# Patient Record
Sex: MALE | Race: WHITE | HISPANIC OR LATINO
[De-identification: names, ages, dates, MRNs, and addresses within clinical notes are randomized per-mention and may not be internally consistent; named-entity substitution may affect disease eponyms.]

---

## 2017-03-24 ENCOUNTER — APPOINTMENT (OUTPATIENT)
Dept: PULMONOLOGY | Facility: CLINIC | Age: 67
End: 2017-03-24

## 2017-03-24 VITALS
SYSTOLIC BLOOD PRESSURE: 122 MMHG | BODY MASS INDEX: 25.11 KG/M2 | RESPIRATION RATE: 17 BRPM | HEART RATE: 74 BPM | OXYGEN SATURATION: 97 % | DIASTOLIC BLOOD PRESSURE: 80 MMHG | WEIGHT: 160 LBS | HEIGHT: 67 IN

## 2017-03-24 DIAGNOSIS — R79.89 OTHER SPECIFIED ABNORMAL FINDINGS OF BLOOD CHEMISTRY: ICD-10-CM

## 2017-06-01 ENCOUNTER — LABORATORY RESULT (OUTPATIENT)
Age: 67
End: 2017-06-01

## 2017-06-13 ENCOUNTER — APPOINTMENT (OUTPATIENT)
Age: 67
End: 2017-06-13

## 2017-07-20 ENCOUNTER — APPOINTMENT (OUTPATIENT)
Age: 67
End: 2017-07-20

## 2017-07-20 VITALS
BODY MASS INDEX: 25.11 KG/M2 | DIASTOLIC BLOOD PRESSURE: 85 MMHG | WEIGHT: 160 LBS | HEIGHT: 67 IN | RESPIRATION RATE: 17 BRPM | SYSTOLIC BLOOD PRESSURE: 128 MMHG | HEART RATE: 73 BPM | TEMPERATURE: 98.5 F

## 2017-07-20 RX ORDER — MOMETASONE FUROATE 1 MG/G
0.1 CREAM TOPICAL TWICE DAILY
Qty: 15 | Refills: 3 | Status: DISCONTINUED | COMMUNITY
Start: 2017-03-24 | End: 2017-07-20

## 2017-08-29 ENCOUNTER — OUTPATIENT (OUTPATIENT)
Dept: OUTPATIENT SERVICES | Facility: HOSPITAL | Age: 67
LOS: 1 days | End: 2017-08-29
Payer: MEDICARE

## 2017-08-29 ENCOUNTER — APPOINTMENT (OUTPATIENT)
Age: 67
End: 2017-08-29

## 2017-08-29 DIAGNOSIS — B18.2 CHRONIC VIRAL HEPATITIS C: ICD-10-CM

## 2017-08-29 PROCEDURE — 45378 DIAGNOSTIC COLONOSCOPY: CPT | Mod: GC

## 2017-08-29 PROCEDURE — G0121: CPT

## 2017-10-06 ENCOUNTER — APPOINTMENT (OUTPATIENT)
Dept: PULMONOLOGY | Facility: CLINIC | Age: 67
End: 2017-10-06

## 2018-06-29 ENCOUNTER — LABORATORY RESULT (OUTPATIENT)
Age: 68
End: 2018-06-29

## 2018-07-20 ENCOUNTER — APPOINTMENT (OUTPATIENT)
Dept: HEPATOLOGY | Facility: CLINIC | Age: 68
End: 2018-07-20
Payer: MEDICARE

## 2018-07-20 VITALS
WEIGHT: 159 LBS | HEIGHT: 67 IN | DIASTOLIC BLOOD PRESSURE: 70 MMHG | HEART RATE: 63 BPM | OXYGEN SATURATION: 96 % | BODY MASS INDEX: 24.96 KG/M2 | TEMPERATURE: 97.8 F | SYSTOLIC BLOOD PRESSURE: 130 MMHG

## 2018-07-20 PROCEDURE — 99213 OFFICE O/P EST LOW 20 MIN: CPT

## 2018-07-20 RX ORDER — SODIUM SULFATE, POTASSIUM SULFATE, MAGNESIUM SULFATE 17.5; 3.13; 1.6 G/ML; G/ML; G/ML
17.5-3.13-1.6 SOLUTION, CONCENTRATE ORAL
Qty: 1 | Refills: 0 | Status: DISCONTINUED | COMMUNITY
Start: 2017-07-20 | End: 2018-07-20

## 2018-07-27 PROBLEM — R79.89 LOW TESTOSTERONE: Status: ACTIVE | Noted: 2017-03-24

## 2018-09-24 ENCOUNTER — APPOINTMENT (OUTPATIENT)
Dept: OPHTHALMOLOGY | Facility: CLINIC | Age: 68
End: 2018-09-24
Payer: MEDICARE

## 2018-09-24 DIAGNOSIS — H49.12 FOURTH [TROCHLEAR] NERVE PALSY, LEFT EYE: ICD-10-CM

## 2018-09-24 DIAGNOSIS — H53.2 DIPLOPIA: ICD-10-CM

## 2018-09-24 DIAGNOSIS — Z09 ENCOUNTER FOR FOLLOW-UP EXAMINATION AFTER COMPLETED TREATMENT FOR CONDITIONS OTHER THAN MALIGNANT NEOPLASM: ICD-10-CM

## 2018-09-24 PROCEDURE — 92060 SENSORIMOTOR EXAMINATION: CPT

## 2018-09-24 PROCEDURE — 99204 OFFICE O/P NEW MOD 45 MIN: CPT

## 2019-07-16 LAB
ALBUMIN SERPL ELPH-MCNC: 4.6 G/DL
ALP BLD-CCNC: 81 U/L
ALT SERPL-CCNC: 16 U/L
AST SERPL-CCNC: 18 U/L
BASOPHILS # BLD AUTO: 0.07 K/UL
BASOPHILS NFR BLD AUTO: 0.7 %
BILIRUB DIRECT SERPL-MCNC: 0.1 MG/DL
BILIRUB INDIRECT SERPL-MCNC: 0.2 MG/DL
BILIRUB SERPL-MCNC: 0.3 MG/DL
EOSINOPHIL # BLD AUTO: 0.24 K/UL
EOSINOPHIL NFR BLD AUTO: 2.4 %
HCT VFR BLD CALC: 45.3 %
HGB BLD-MCNC: 14.8 G/DL
IMM GRANULOCYTES NFR BLD AUTO: 0.5 %
LYMPHOCYTES # BLD AUTO: 2.11 K/UL
LYMPHOCYTES NFR BLD AUTO: 20.8 %
MAN DIFF?: NORMAL
MCHC RBC-ENTMCNC: 32 PG
MCHC RBC-ENTMCNC: 32.7 GM/DL
MCV RBC AUTO: 98.1 FL
MONOCYTES # BLD AUTO: 0.62 K/UL
MONOCYTES NFR BLD AUTO: 6.1 %
NEUTROPHILS # BLD AUTO: 7.04 K/UL
NEUTROPHILS NFR BLD AUTO: 69.5 %
PLATELET # BLD AUTO: 226 K/UL
PROT SERPL-MCNC: 7.3 G/DL
RBC # BLD: 4.62 M/UL
RBC # FLD: 11.9 %
WBC # FLD AUTO: 10.13 K/UL

## 2019-07-17 LAB
HCV RNA SERPL NAA DL=5-ACNC: NOT DETECTED IU/ML
HCV RNA SERPL NAA+PROBE-LOG IU: NOT DETECTED LOGIU/ML

## 2019-07-19 ENCOUNTER — APPOINTMENT (OUTPATIENT)
Dept: HEPATOLOGY | Facility: CLINIC | Age: 69
End: 2019-07-19
Payer: MEDICARE

## 2019-07-19 VITALS
SYSTOLIC BLOOD PRESSURE: 143 MMHG | HEIGHT: 67 IN | BODY MASS INDEX: 25.27 KG/M2 | DIASTOLIC BLOOD PRESSURE: 82 MMHG | WEIGHT: 161 LBS | HEART RATE: 65 BPM | TEMPERATURE: 98.2 F | RESPIRATION RATE: 17 BRPM

## 2019-07-19 PROCEDURE — ZZZZZ: CPT

## 2019-07-19 PROCEDURE — 91200 LIVER ELASTOGRAPHY: CPT

## 2019-07-19 PROCEDURE — 99214 OFFICE O/P EST MOD 30 MIN: CPT | Mod: 25

## 2019-07-19 NOTE — PHYSICAL EXAM
[General Appearance - Alert] : alert [General Appearance - In No Acute Distress] : in no acute distress [Sclera] : the sclera and conjunctiva were normal [Neck Appearance] : the appearance of the neck was normal [Neck Cervical Mass (___cm)] : no neck mass was observed [Jugular Venous Distention Increased] : there was no jugular-venous distention [Thyroid Diffuse Enlargement] : the thyroid was not enlarged [Thyroid Nodule] : there were no palpable thyroid nodules [Auscultation Breath Sounds / Voice Sounds] : lungs were clear to auscultation bilaterally [Heart Rate And Rhythm] : heart rate was normal and rhythm regular [Heart Sounds] : normal S1 and S2 [Full Pulse] : the pedal pulses are present [Edema] : there was no peripheral edema [Bowel Sounds] : normal bowel sounds [Abdomen Soft] : soft [Abdomen Tenderness] : non-tender [] : no hepato-splenomegaly [Abdomen Mass (___ Cm)] : no abdominal mass palpated [Oriented To Time, Place, And Person] : oriented to person, place, and time [Scleral Icterus] : No Scleral Icterus [Hepatojugular Reflux] : patient did not have a sustained hepatojugular reflux [Spider Angioma] : No spider angioma(s) were observed [Abdominal Bruit] : no abdominal bruit [Abdominal  Ascites] : no ascites [Splenomegaly] : no splenomegaly [Asterixis] : no asterixis observed [Jaundice] : No jaundice [Palmar Erythema] : no Palmar Erythema

## 2019-07-19 NOTE — CONSULT LETTER
[Dear  ___] : Dear  [unfilled], [Courtesy Letter:] : I had the pleasure of seeing your patient, [unfilled], in my office today. [Please see my note below.] : Please see my note below. [Consult Closing:] : Thank you very much for allowing me to participate in the care of this patient.  If you have any questions, please do not hesitate to contact me. [Sincerely,] : Sincerely, [Marcella Schroeder, N.P] : Marcella Schroeder, N.P [FreeTextEntry2] : Dr. Tj Augustine

## 2019-07-19 NOTE — REVIEW OF SYSTEMS
[Negative] : Psychiatric [Recent Weight Gain (___ Lbs)] : no recent weight gain [Recent Weight Loss (___ Lbs)] : no recent weight loss

## 2019-07-19 NOTE — ASSESSMENT
[FreeTextEntry1] : 69-year-old male with history of hepatitis C with F0-F1 fibrosis on fibroscan tests S/P treatment since 2012 who is a sustained virologic responder.  He is stable and doing well. \par \par I have discussed with patient the finding of steatosis on Fibroscan. His liver enzymes are normal. I spoke with the patient at length regarding fatty liver disease. I have reviewed the spectrum of disease as well as the risk of disease progression. I have explained that patient with fatty liver disease may progress to the development of cirrhosis and its complications. I have explained that fatty liver disease is commonly seen in patients with diabetes or those who are overweight. I have explained that fatty liver disease may also be a precursor to the development of diabetes as it is part of the metabolic syndrome. I have reviewed the treatment of fatty liver disease with the patient. I have explained that the best therapy is diet and exercise. I have recommended the avoidance of fatty foods and to follow a good healthy heart diet.  \par \par He will need to repeat colonoscopy in 2022.\par \par  I will see him back in the office in one year with repeat labs and imaging.  I have asked the patient to call me if he has any questions or concerns in the interim.\par \par

## 2019-07-19 NOTE — HISTORY OF PRESENT ILLNESS
[Ordering Test(s) ___] : ordering [unfilled] [None] : ~he/she~ had no significant interval events [Fatigue] : denies fatigue [Malaise] : denies malaise [Fever] : denies fever [Diffuse Joint Pain (Arthralgias)] : denies arthralgias [Muscle Aches, Generalized (Myalgias)] : denies myalgias [Yellow Skin Or Eyes (Jaundice)] : denies jaundice [Abdominal Pain] : denies abdominal pain [Urine Tests Nonspecific Abnormal Findings Biliuria] : denies dark urine [Light Colored Bowel Movement (Acholic Stools)] : denies pale stools [Itching (Pruritus)] : denies pruritus [___] : Performed [unfilled] [de-identified] : Darren comes in today for followup visit. Patient has a history of hepatitis C who completed 28 weeks of therapy for hepatitis C with Pegasys, ribavirin and Victrelis on March 5, 2012 and is a sustained virologic responder.  He is feeling well without any other complaints. He denies abdominal pain, nausea, vomiting, jaundice or pruritus. He has no changes in bowel habits. He does not do routine exercise. \par \par  Fibroscan test from 5/8/15 showed F0-F1. \par Fibroscan test performed  7/20/17 showed F0-F1, steatosis S1. \par Fibroscan test from 7/19/19 showed F0-F1, S1\par \par Last colonoscopy was done in May 2010 revealing normal colon. His mother has colon cancer in Dec 2016. Colonoscopy from 8/29/17 was normal. \par \par Blood tests from June 1, 2017 ALT 23, AST 22, total bilirubin 0.3, HCV RNA not detected.\par Blood test from March 12, 2015 ALT 14, AST 15, total bilirubin 0.3, HCV RNA not detected.\par \par Reviewed of laboratory data from 2/20/14 ALT 19, AST 20, total bilirubin 0.2, albumin 4.1, normal CBC with platelets, HCV RNA not detected.\par \par

## 2019-09-02 PROBLEM — Z09 FOLLOW UP: Status: ACTIVE | Noted: 2018-07-20

## 2020-03-02 ENCOUNTER — NON-APPOINTMENT (OUTPATIENT)
Age: 70
End: 2020-03-02

## 2020-03-02 ENCOUNTER — APPOINTMENT (OUTPATIENT)
Dept: PULMONOLOGY | Facility: CLINIC | Age: 70
End: 2020-03-02
Payer: MEDICARE

## 2020-03-02 VITALS
OXYGEN SATURATION: 93 % | RESPIRATION RATE: 17 BRPM | HEIGHT: 66 IN | BODY MASS INDEX: 25.23 KG/M2 | WEIGHT: 157 LBS | DIASTOLIC BLOOD PRESSURE: 64 MMHG | SYSTOLIC BLOOD PRESSURE: 120 MMHG | HEART RATE: 86 BPM

## 2020-03-02 DIAGNOSIS — J45.909 ACUTE BRONCHITIS, UNSPECIFIED: ICD-10-CM

## 2020-03-02 DIAGNOSIS — R93.89 ABNORMAL FINDINGS ON DIAGNOSTIC IMAGING OF OTHER SPECIFIED BODY STRUCTURES: ICD-10-CM

## 2020-03-02 DIAGNOSIS — J45.909 UNSPECIFIED ASTHMA, UNCOMPLICATED: ICD-10-CM

## 2020-03-02 DIAGNOSIS — J20.9 ACUTE BRONCHITIS, UNSPECIFIED: ICD-10-CM

## 2020-03-02 DIAGNOSIS — R04.2 HEMOPTYSIS: ICD-10-CM

## 2020-03-02 DIAGNOSIS — R09.82 POSTNASAL DRIP: ICD-10-CM

## 2020-03-02 DIAGNOSIS — J30.9 ALLERGIC RHINITIS, UNSPECIFIED: ICD-10-CM

## 2020-03-02 DIAGNOSIS — R94.2 ABNORMAL RESULTS OF PULMONARY FUNCTION STUDIES: ICD-10-CM

## 2020-03-02 PROCEDURE — 71046 X-RAY EXAM CHEST 2 VIEWS: CPT

## 2020-03-02 PROCEDURE — 94010 BREATHING CAPACITY TEST: CPT

## 2020-03-02 PROCEDURE — 99214 OFFICE O/P EST MOD 30 MIN: CPT | Mod: 25

## 2020-03-02 NOTE — PHYSICAL EXAM
[General Appearance - Well Developed] : well developed [Well Groomed] : well groomed [Normal Appearance] : normal appearance [General Appearance - Well Nourished] : well nourished [No Deformities] : no deformities [General Appearance - In No Acute Distress] : no acute distress [Eyelids - No Xanthelasma] : the eyelids demonstrated no xanthelasmas [Normal Conjunctiva] : the conjunctiva exhibited no abnormalities [Normal Oropharynx] : normal oropharynx [II] : II [Neck Appearance] : the appearance of the neck was normal [Neck Cervical Mass (___cm)] : no neck mass was observed [Thyroid Diffuse Enlargement] : the thyroid was not enlarged [Jugular Venous Distention Increased] : there was no jugular-venous distention [Heart Rate And Rhythm] : heart rate and rhythm were normal [Thyroid Nodule] : there were no palpable thyroid nodules [Heart Sounds] : normal S1 and S2 [Murmurs] : no murmurs present [Respiration, Rhythm And Depth] : normal respiratory rhythm and effort [Exaggerated Use Of Accessory Muscles For Inspiration] : no accessory muscle use [Abdomen Soft] : soft [Abdomen Tenderness] : non-tender [Abnormal Walk] : normal gait [Abdomen Mass (___ Cm)] : no abdominal mass palpated [Gait - Sufficient For Exercise Testing] : the gait was sufficient for exercise testing [Nail Clubbing] : no clubbing of the fingernails [Cyanosis, Localized] : no localized cyanosis [Petechial Hemorrhages (___cm)] : no petechial hemorrhages [Skin Color & Pigmentation] : normal skin color and pigmentation [No Venous Stasis] : no venous stasis [] : no rash [No Skin Ulcers] : no skin ulcer [Skin Lesions] : no skin lesions [No Xanthoma] : no  xanthoma was observed [Deep Tendon Reflexes (DTR)] : deep tendon reflexes were 2+ and symmetric [Sensation] : the sensory exam was normal to light touch and pinprick [No Focal Deficits] : no focal deficits [Oriented To Time, Place, And Person] : oriented to person, place, and time [Affect] : the affect was normal [Impaired Insight] : insight and judgment were intact [FreeTextEntry1] : I:E ratio 1:3; mild wheeze and rhonchi

## 2020-03-02 NOTE — HISTORY OF PRESENT ILLNESS
[FreeTextEntry1] : Mr. Swann is a 69 year old male presenting to the office today for a follow up visit for abnormal CT scan, abnormal PFT, allergies, asthma, and post nasal drip syndrome. His chief complaint is bronchitis\par -his wife reports he has been bringing up sputum. He has a post nasal drip and usually brings up the sputum. His sputum has now become darker in color / bloody about 2 weeks ago, and then saw Dr. Augustine, who gave him ABx (Zithromax) therapy with 1 day left\par -he reports he has a dry mouth occasionally\par -He notes His bowels are regular \par -his wife states he had a UTI recently, and has not been sick otherwise\par -he has had an ablation with [facility] since his last visit\par -he is sleeping well, and denies snoring\par -he reports he is not using any therapy for his post nasal drip\par -he denies any SOB, orthopnea, fever, chills, sweats, chest pain, chest pressure, diarrhea, constipation, dysphagia, dizziness, sour taste in the mouth,heartburn, reflux, myalgias or arthralgias.

## 2020-03-02 NOTE — ADDENDUM
[FreeTextEntry1] : Documented by Jhony Li acting as a scribe for Dr. Oni Jorgensen on 03/02/2020.\par \par All medical record entries made by the Scribe were at my, Dr. Oni Jorgensen's, direction and personally dictated by me on 03/02/2020. I have reviewed the chart and agree that the record accurately reflects my personal performance of the history, physical exam, assessment and plan. I have also personally directed, reviewed, and agree with the discharge instructions.

## 2020-03-02 NOTE — ASSESSMENT
[FreeTextEntry1] : Mr. Swann has a history of DM, Afib s/p ablation, 4th nerve palsy, HTN, mild intermittent asthma and allergies, presenting to the office in the midst of acute bronchitis / asthmatic bronchitis\par \par Problem 1A: Acute bronchitis\par -continue Zithromax therapy\par \par You have a clinical scenario most c/q acute bronchitis the etiology of which is unknown but empiric antibiotics are indicated. Hydration, mucolytics including mucinex, robitussin and the like are indicated. Cough controlling agents will be needed. \par \par problem 1: mild intermittent asthma\par -Add generic Advair (250) 1 inhalation BID \par \par -Asthma is  believed to be caused by inherited (genetic) and environmental factor, but its exact cause is unknown. Asthma may be triggered by allergens, lung infections, or irritants in the air. Asthma triggers are different for each person\par \par problem 2: allergies and sinuses \par -Add Olopatadine 0.6% at 1 sniff/nostril BID \par -recommended to use baby shampoo to clean his eye lashes\par -can add OTC antihistamines as needed\par -continue to use nasal saline \par \par -Environmental measures for allergies were encouraged including mattress and pillow cover, air purifier, and environmental controls. \par \par problem 3: history of an abnormal chest CT / Hemoptysis\par -showed a 5 mm nodule which has been stable\par -he should have a follow up chest CT at September 2017, due now \par \par problem 4: itchy ears\par -he is being added mometasone cream \par \par problem 5: concerns for memory \par -he is being recommended to use OTC resveratrol \par \par problem 6: use of hyperlipidemia medicine\par -he is being added vitamin D at 50,000 units once a month \par \par F/U in 4-6 months\par He is encouraged to call with any changes, concerns, or questions.

## 2020-03-02 NOTE — REVIEW OF SYSTEMS
[Postnasal Drip] : postnasal drip [Hemoptysis] : hemoptysis [Sputum] : sputum [Negative] : Endocrine [Dyspnea] : no dyspnea [Chest Discomfort] : no chest discomfort

## 2020-03-02 NOTE — PROCEDURE
[FreeTextEntry1] : CXR reveals a normal sized heart; no evidence of infiltrate or effusion--a normal appearing chest radiograph \par \par PFT revealed normal flows, with a FEV1 of 2.50L, which is 90% of predicted, with a normal flow volume loop

## 2020-07-06 ENCOUNTER — TRANSCRIPTION ENCOUNTER (OUTPATIENT)
Age: 70
End: 2020-07-06

## 2020-07-10 ENCOUNTER — APPOINTMENT (OUTPATIENT)
Dept: HEPATOLOGY | Facility: CLINIC | Age: 70
End: 2020-07-10

## 2020-07-24 ENCOUNTER — APPOINTMENT (OUTPATIENT)
Dept: HEPATOLOGY | Facility: CLINIC | Age: 70
End: 2020-07-24

## 2020-07-24 LAB
ALBUMIN SERPL ELPH-MCNC: 4.9 G/DL
ALP BLD-CCNC: 82 U/L
ALT SERPL-CCNC: 13 U/L
AST SERPL-CCNC: 16 U/L
BILIRUB DIRECT SERPL-MCNC: 0.1 MG/DL
BILIRUB INDIRECT SERPL-MCNC: 0.2 MG/DL
BILIRUB SERPL-MCNC: 0.3 MG/DL
PROT SERPL-MCNC: 7.6 G/DL

## 2020-07-27 LAB
HCV RNA SERPL NAA DL=5-ACNC: NOT DETECTED IU/ML
HCV RNA SERPL NAA+PROBE-LOG IU: NOT DETECTED LOG10IU/ML

## 2020-07-29 ENCOUNTER — APPOINTMENT (OUTPATIENT)
Dept: HEPATOLOGY | Facility: CLINIC | Age: 70
End: 2020-07-29
Payer: MEDICARE

## 2020-07-29 VITALS
SYSTOLIC BLOOD PRESSURE: 139 MMHG | BODY MASS INDEX: 24.27 KG/M2 | WEIGHT: 151 LBS | HEIGHT: 66 IN | TEMPERATURE: 97.9 F | HEART RATE: 85 BPM | DIASTOLIC BLOOD PRESSURE: 81 MMHG | RESPIRATION RATE: 17 BRPM

## 2020-07-29 PROCEDURE — 99213 OFFICE O/P EST LOW 20 MIN: CPT

## 2020-07-29 RX ORDER — OLOPATADINE HYDROCHLORIDE 665 UG/1
0.6 SPRAY, METERED NASAL
Qty: 3 | Refills: 1 | Status: DISCONTINUED | COMMUNITY
Start: 2020-03-02 | End: 2020-07-29

## 2020-07-29 NOTE — ASSESSMENT
[FreeTextEntry1] : Mr. 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  He is stable and doing well. \par \par PLAN To continue the current treatment for maintenance.\par F/U in one year with repeat labs and imaging.  I have asked the patient to call me if he has any questions or concerns in the interim.\par Colonoscopy- He will need to repeat colonoscopy in 2022.\par \par I have discussed with patient the finding of steatosis on Fibroscan. His liver enzymes are normal. I spoke with the patient at length regarding fatty liver disease. I have reviewed the spectrum of disease as well as the risk of disease progression. I have explained that patient with fatty liver disease may progress to the development of cirrhosis and its complications. I have explained that fatty liver disease is commonly seen in patients with diabetes or those who are overweight. I have explained that fatty liver disease may also be a precursor to the development of diabetes as it is part of the metabolic syndrome. I have reviewed the treatment of fatty liver disease with the patient. I have explained that the best therapy is diet and exercise. I have recommended the avoidance of fatty foods and to follow a good healthy heart diet.  \par \par Encouraged to call back in the interim with any issues or concerns so that we can address and assist as required.\par

## 2020-07-29 NOTE — HISTORY OF PRESENT ILLNESS
[Ordering Test(s) ___] : ordering [unfilled] [None] : ~he/she~ had no significant interval events [Fatigue] : denies fatigue [Malaise] : denies malaise [Diffuse Joint Pain (Arthralgias)] : denies arthralgias [Fever] : denies fever [Yellow Skin Or Eyes (Jaundice)] : denies jaundice [Muscle Aches, Generalized (Myalgias)] : denies myalgias [Urine Tests Nonspecific Abnormal Findings Biliuria] : denies dark urine [Abdominal Pain] : denies abdominal pain [Light Colored Bowel Movement (Acholic Stools)] : denies pale stools [Itching (Pruritus)] : denies pruritus [___] : Performed [unfilled] [de-identified] : Mr. AMRIK INMAN is 70 year old male who presents for the follow up appointment with H/O hepatitis C who completed 28 weeks of therapy for hepatitis C with Pegasys, ribavirin and Victrelis on March 5, 2012 and is a sustained virologic responder.  He is feeling well without any other complaints. He denies abdominal pain, nausea, vomiting, jaundice or pruritus. He has no changes in bowel habits. He does not do routine exercise. \par \par Reviewed Ultrasound Abdomen 07/27/20 Normal Abdominal Ultrasound results with No focal lesions.\par \par Fibroscan test from 7/19/19 showed F0-F1, S1\par Fibroscan test performed 7/20/17 showed F0-F1, steatosis S1. \par Fibroscan test from 5/8/15 showed F0-F1. \par \par Colonoscopy from 8/29/17 was normal.\par Colonoscopy was done in May 2010 revealing normal colon. His mother has colon cancer in Dec 2016. \par \par Blood tests from 07/23/20 HCV RNA not detected. TPr 7.6 , Alb 4.9 g/dL, TBi 0.3 mg/dL, AST 16 , ALT 13 , ALP 82 U/L \par June 1, 2017 ALT 23, AST 22, total bilirubin 0.3, HCV RNA not detected.\par Blood test from March 12, 2015 ALT 14, AST 15, total bilirubin 0.3, HCV RNA not detected.\par 2/20/14 ALT 19, AST 20, total bilirubin 0.2, albumin 4.1, normal CBC with platelets, HCV RNA not detected.\par

## 2020-07-29 NOTE — PHYSICAL EXAM
[General Appearance - Alert] : alert [General Appearance - In No Acute Distress] : in no acute distress [Sclera] : the sclera and conjunctiva were normal [Neck Cervical Mass (___cm)] : no neck mass was observed [Neck Appearance] : the appearance of the neck was normal [Jugular Venous Distention Increased] : there was no jugular-venous distention [Thyroid Diffuse Enlargement] : the thyroid was not enlarged [Thyroid Nodule] : there were no palpable thyroid nodules [Auscultation Breath Sounds / Voice Sounds] : lungs were clear to auscultation bilaterally [Heart Rate And Rhythm] : heart rate was normal and rhythm regular [Heart Sounds] : normal S1 and S2 [Full Pulse] : the pedal pulses are present [Edema] : there was no peripheral edema [Bowel Sounds] : normal bowel sounds [Abdomen Soft] : soft [Abdomen Tenderness] : non-tender [Abdomen Mass (___ Cm)] : no abdominal mass palpated [Oriented To Time, Place, And Person] : oriented to person, place, and time [Scleral Icterus] : No Scleral Icterus [Hepatojugular Reflux] : patient did not have a sustained hepatojugular reflux [Spider Angioma] : No spider angioma(s) were observed [Abdominal Bruit] : no abdominal bruit [Abdominal  Ascites] : no ascites [Splenomegaly] : no splenomegaly [Asterixis] : no asterixis observed [Jaundice] : No jaundice [Palmar Erythema] : no Palmar Erythema [Cervical Lymph Nodes Enlarged Posterior Bilaterally] : posterior cervical [Abnormal Walk] : normal gait [Supraclavicular Lymph Nodes Enlarged Bilaterally] : supraclavicular [Skin Color & Pigmentation] : normal skin color and pigmentation [] : no rash

## 2020-09-11 ENCOUNTER — APPOINTMENT (OUTPATIENT)
Dept: PULMONOLOGY | Facility: CLINIC | Age: 70
End: 2020-09-11

## 2021-06-09 ENCOUNTER — OUTPATIENT (OUTPATIENT)
Dept: OUTPATIENT SERVICES | Facility: HOSPITAL | Age: 71
LOS: 1 days | End: 2021-06-09
Payer: MEDICARE

## 2021-06-09 ENCOUNTER — APPOINTMENT (OUTPATIENT)
Dept: NUCLEAR MEDICINE | Facility: IMAGING CENTER | Age: 71
End: 2021-06-09
Payer: MEDICARE

## 2021-06-09 DIAGNOSIS — Z00.8 ENCOUNTER FOR OTHER GENERAL EXAMINATION: ICD-10-CM

## 2021-06-09 PROCEDURE — 78608 BRAIN IMAGING (PET): CPT | Mod: 26

## 2021-06-09 PROCEDURE — A9552: CPT

## 2021-06-09 PROCEDURE — 78608 BRAIN IMAGING (PET): CPT

## 2021-07-09 ENCOUNTER — APPOINTMENT (OUTPATIENT)
Dept: RADIOLOGY | Facility: HOSPITAL | Age: 71
End: 2021-07-09
Payer: MEDICARE

## 2021-07-09 ENCOUNTER — OUTPATIENT (OUTPATIENT)
Dept: OUTPATIENT SERVICES | Facility: HOSPITAL | Age: 71
LOS: 1 days | End: 2021-07-09
Payer: MEDICARE

## 2021-07-09 DIAGNOSIS — G91.2 (IDIOPATHIC) NORMAL PRESSURE HYDROCEPHALUS: ICD-10-CM

## 2021-07-09 LAB — GLUCOSE CSF-MCNC: <6 MG/DL — LOW (ref 40–70)

## 2021-07-09 PROCEDURE — 87205 SMEAR GRAM STAIN: CPT

## 2021-07-09 PROCEDURE — 82945 GLUCOSE OTHER FLUID: CPT

## 2021-07-09 PROCEDURE — 87070 CULTURE OTHR SPECIMN AEROBIC: CPT

## 2021-07-09 PROCEDURE — 62328 DX LMBR SPI PNXR W/FLUOR/CT: CPT

## 2021-07-09 PROCEDURE — 84157 ASSAY OF PROTEIN OTHER: CPT

## 2021-07-09 PROCEDURE — 89051 BODY FLUID CELL COUNT: CPT

## 2021-08-25 ENCOUNTER — OUTPATIENT (OUTPATIENT)
Dept: OUTPATIENT SERVICES | Facility: HOSPITAL | Age: 71
LOS: 1 days | End: 2021-08-25
Payer: MEDICARE

## 2021-08-25 ENCOUNTER — APPOINTMENT (OUTPATIENT)
Dept: ULTRASOUND IMAGING | Facility: IMAGING CENTER | Age: 71
End: 2021-08-25
Payer: MEDICARE

## 2021-08-25 DIAGNOSIS — B18.2 CHRONIC VIRAL HEPATITIS C: ICD-10-CM

## 2021-08-25 DIAGNOSIS — Z00.8 ENCOUNTER FOR OTHER GENERAL EXAMINATION: ICD-10-CM

## 2021-08-25 PROCEDURE — 76700 US EXAM ABDOM COMPLETE: CPT | Mod: 26

## 2021-08-25 PROCEDURE — 76700 US EXAM ABDOM COMPLETE: CPT

## 2021-08-26 LAB
ALBUMIN SERPL ELPH-MCNC: 4.4 G/DL
ALBUMIN SERPL ELPH-MCNC: 4.5 G/DL
ALP BLD-CCNC: 93 U/L
ALP BLD-CCNC: 94 U/L
ALT SERPL-CCNC: 12 U/L
ALT SERPL-CCNC: 15 U/L
ANION GAP SERPL CALC-SCNC: 15 MMOL/L
AST SERPL-CCNC: 13 U/L
AST SERPL-CCNC: 14 U/L
BASOPHILS # BLD AUTO: 0.03 K/UL
BASOPHILS NFR BLD AUTO: 0.3 %
BILIRUB DIRECT SERPL-MCNC: 0.1 MG/DL
BILIRUB INDIRECT SERPL-MCNC: 0.2 MG/DL
BILIRUB SERPL-MCNC: 0.3 MG/DL
BILIRUB SERPL-MCNC: 0.3 MG/DL
BUN SERPL-MCNC: 13 MG/DL
CALCIUM SERPL-MCNC: 10 MG/DL
CHLORIDE SERPL-SCNC: 102 MMOL/L
CO2 SERPL-SCNC: 24 MMOL/L
CREAT SERPL-MCNC: 0.75 MG/DL
EOSINOPHIL # BLD AUTO: 0.2 K/UL
EOSINOPHIL NFR BLD AUTO: 1.8 %
HCT VFR BLD CALC: 47.5 %
HCV RNA SERPL NAA DL=5-ACNC: NOT DETECTED
HCV RNA SERPL NAA+PROBE-LOG IU: NOT DETECTED LOG10IU/ML
HGB BLD-MCNC: 15.5 G/DL
IMM GRANULOCYTES NFR BLD AUTO: 1.1 %
LYMPHOCYTES # BLD AUTO: 1.52 K/UL
LYMPHOCYTES NFR BLD AUTO: 14 %
MAN DIFF?: NORMAL
MCHC RBC-ENTMCNC: 32.2 PG
MCHC RBC-ENTMCNC: 32.6 GM/DL
MCV RBC AUTO: 98.5 FL
MONOCYTES # BLD AUTO: 0.78 K/UL
MONOCYTES NFR BLD AUTO: 7.2 %
NEUTROPHILS # BLD AUTO: 8.24 K/UL
NEUTROPHILS NFR BLD AUTO: 75.6 %
PLATELET # BLD AUTO: 204 K/UL
POTASSIUM SERPL-SCNC: 4.4 MMOL/L
PROT SERPL-MCNC: 6.9 G/DL
PROT SERPL-MCNC: 7.2 G/DL
RBC # BLD: 4.82 M/UL
RBC # FLD: 11.9 %
SODIUM SERPL-SCNC: 142 MMOL/L
WBC # FLD AUTO: 10.89 K/UL

## 2021-08-30 LAB — HCV RNA FLD QL NAA+PROBE: NEGATIVE

## 2021-09-01 ENCOUNTER — APPOINTMENT (OUTPATIENT)
Dept: HEPATOLOGY | Facility: CLINIC | Age: 71
End: 2021-09-01
Payer: MEDICARE

## 2021-09-01 VITALS
TEMPERATURE: 97.9 F | RESPIRATION RATE: 17 BRPM | HEIGHT: 66 IN | SYSTOLIC BLOOD PRESSURE: 111 MMHG | BODY MASS INDEX: 24.59 KG/M2 | HEART RATE: 76 BPM | DIASTOLIC BLOOD PRESSURE: 52 MMHG | WEIGHT: 153 LBS

## 2021-09-01 DIAGNOSIS — K74.00 HEPATIC FIBROSIS, UNSPECIFIED: ICD-10-CM

## 2021-09-01 DIAGNOSIS — Z12.11 ENCOUNTER FOR SCREENING FOR MALIGNANT NEOPLASM OF COLON: ICD-10-CM

## 2021-09-01 PROCEDURE — 91200 LIVER ELASTOGRAPHY: CPT

## 2021-09-01 PROCEDURE — 99215 OFFICE O/P EST HI 40 MIN: CPT

## 2021-09-01 RX ORDER — TADALAFIL 10 MG/1
10 TABLET, FILM COATED ORAL
Refills: 0 | Status: ACTIVE | COMMUNITY
Start: 2021-09-01

## 2021-09-01 RX ORDER — TAMSULOSIN HYDROCHLORIDE 0.4 MG/1
0.4 CAPSULE ORAL
Refills: 0 | Status: ACTIVE | COMMUNITY
Start: 2021-09-01

## 2021-09-01 RX ORDER — DAPAGLIFLOZIN 5 MG/1
5 TABLET, FILM COATED ORAL
Refills: 0 | Status: ACTIVE | COMMUNITY
Start: 2021-09-01

## 2021-09-01 RX ORDER — ERGOCALCIFEROL 1.25 MG/1
1.25 MG CAPSULE, LIQUID FILLED ORAL
Qty: 6 | Refills: 1 | Status: ACTIVE | COMMUNITY
Start: 2017-03-24

## 2021-09-02 PROBLEM — K74.00 HEPATIC FIBROSIS: Status: ACTIVE | Noted: 2021-09-02

## 2021-09-02 PROBLEM — Z12.11 COLON CANCER SCREENING: Status: ACTIVE | Noted: 2017-07-20

## 2021-09-02 NOTE — ASSESSMENT
[FreeTextEntry1] : Mr. AMRIK INMAN is 71 year old male with Fatty liver and H/O HCV treatment now SVR. \par \par # Hepatic steatosis - Fibroscan on 09/01/2021 shows F0-1 (E 4.0 kPa) and S2 ( dB/m). \par # Hepatic fibrosis - US abs on 08/25/21 shows No mass or morphologic changes to suggest cirrhosis. \par AMRIK was educated about the fatty liver disease. Reviewed the spectrum of disease, the risk of disease progression to developing cirrhosis and the associated complications. Explained the patient may develop liver cancer without cirrhosis and therefore should be under the yearly surveillance with an abdominal ultrasound. Taught back that the best treatment of fatty liver disease is diet and exercise. Discussed the present diet with the patient and recommended the avoidance of fatty foods and to follow a heart healthy diet. Also explained that weight loss may lead to an improvement in the overall underlying liver disease. Taught back the physiology of alcohol abstinence has a important contribution to liver health. 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  \par \par # Colonoscopy- Colonoscopy from 8/29/17 was normal. His mother has colon cancer in Dec 2016. Due for repeat colonoscopy in 2022.\par \par # Immunity – Will check for Immunity to HAV, HBV with next labs.\par \par PLAN To F/U in one year with repeat labs, US imaging and FS with RPA.\par Encouraged to call back in the interim with any issues or concerns so that we can address and assist as required.\par \par

## 2021-09-02 NOTE — HISTORY OF PRESENT ILLNESS
[Fatigue] : denies fatigue [Malaise] : denies malaise [Fever] : denies fever [Diffuse Joint Pain (Arthralgias)] : denies arthralgias [Muscle Aches, Generalized (Myalgias)] : denies myalgias [Yellow Skin Or Eyes (Jaundice)] : denies jaundice [Abdominal Pain] : denies abdominal pain [Urine Tests Nonspecific Abnormal Findings Biliuria] : denies dark urine [Light Colored Bowel Movement (Acholic Stools)] : denies pale stools [Itching (Pruritus)] : denies pruritus [___] : Performed [unfilled] [de-identified] : Mr. AMRIK INMAN is 71 year old male who presents for the follow up appointment with Fatty liver and H/O HCV treatment now SVR. He is feeling well without any other complaints. He denies abdominal pain, nausea, vomiting, jaundice or pruritus. He has no changes in bowel habits. He does not do routine exercise. \par \par Pertinent H/O hepatitis C who completed 28 weeks of therapy for hepatitis C with Pegasys, ribavirin and Victrelis on March 5, 2012 and is a sustained virologic responder.  \par \par Fibroscan on 09/01/2021 shows F0-1 (E 4.0 kPa) and S2 ( dB/m). \par Fibroscan test from 7/19/19 showed F0-F1, S1\par Fibroscan test performed 7/20/17 showed F0-F1, steatosis S1. \par Fibroscan test from 5/8/15 showed F0-F1. \par \par Colonoscopy from 8/29/17 was normal.\par Colonoscopy in May 2010 revealing normal colon. His mother has colon cancer in Dec 2016. \par \par Labs done on 08/25/2021 shows WDL- CMP, CBC except elevated WBC 10.89 with and HCV- RNA not detected. US abs on 08/25/21 shows No mass or morphologic changes to suggest cirrhosis. \par \par Ultrasound Abdomen 07/25/20 Normal Abdominal Ultrasound results with No focal lesions. Blood tests from 07/23/20 HCV RNA not detected. TPro 7.6, Alb 4.9 g/dL, Tbil 0.3 mg/dL, AST 16, ALT 13, ALP 82 U/L \par June 1, 2017 ALT 23, AST 22, total bilirubin 0.3, HCV RNA not detected.\par \par Blood test from March 12, 2015 ALT 14, AST 15, total bilirubin 0.3, HCV RNA not detected.\par 2/20/14 ALT 19, AST 20, total bilirubin 0.2, albumin 4.1, normal CBC with platelets, HCV RNA not detected.\par

## 2021-09-02 NOTE — PHYSICAL EXAM
[General Appearance - Alert] : alert [General Appearance - In No Acute Distress] : in no acute distress [Sclera] : the sclera and conjunctiva were normal [Neck Appearance] : the appearance of the neck was normal [Neck Cervical Mass (___cm)] : no neck mass was observed [Jugular Venous Distention Increased] : there was no jugular-venous distention [Thyroid Nodule] : there were no palpable thyroid nodules [Thyroid Diffuse Enlargement] : the thyroid was not enlarged [Auscultation Breath Sounds / Voice Sounds] : lungs were clear to auscultation bilaterally [Heart Rate And Rhythm] : heart rate was normal and rhythm regular [Heart Sounds] : normal S1 and S2 [Full Pulse] : the pedal pulses are present [Edema] : there was no peripheral edema [Bowel Sounds] : normal bowel sounds [Abdomen Soft] : soft [Abdomen Tenderness] : non-tender [Abdomen Mass (___ Cm)] : no abdominal mass palpated [Cervical Lymph Nodes Enlarged Posterior Bilaterally] : posterior cervical [Supraclavicular Lymph Nodes Enlarged Bilaterally] : supraclavicular [Abnormal Walk] : normal gait [Skin Color & Pigmentation] : normal skin color and pigmentation [] : no rash [Oriented To Time, Place, And Person] : oriented to person, place, and time [Scleral Icterus] : No Scleral Icterus [Hepatojugular Reflux] : patient did not have a sustained hepatojugular reflux [Spider Angioma] : No spider angioma(s) were observed [Abdominal Bruit] : no abdominal bruit [Abdominal  Ascites] : no ascites [Splenomegaly] : no splenomegaly [Asterixis] : no asterixis observed [Jaundice] : No jaundice [Palmar Erythema] : no Palmar Erythema [General Appearance - Well Nourished] : well nourished [No Focal Deficits] : no focal deficits

## 2021-09-22 ENCOUNTER — APPOINTMENT (OUTPATIENT)
Dept: SPINE | Facility: CLINIC | Age: 71
End: 2021-09-22
Payer: MEDICARE

## 2021-09-22 VITALS
HEART RATE: 89 BPM | DIASTOLIC BLOOD PRESSURE: 84 MMHG | BODY MASS INDEX: 24.59 KG/M2 | SYSTOLIC BLOOD PRESSURE: 131 MMHG | WEIGHT: 153 LBS | OXYGEN SATURATION: 93 % | HEIGHT: 66 IN

## 2021-09-22 DIAGNOSIS — Z87.891 PERSONAL HISTORY OF NICOTINE DEPENDENCE: ICD-10-CM

## 2021-09-22 DIAGNOSIS — G91.2 (IDIOPATHIC) NORMAL PRESSURE HYDROCEPHALUS: ICD-10-CM

## 2021-09-22 DIAGNOSIS — Z86.79 PERSONAL HISTORY OF OTHER DISEASES OF THE CIRCULATORY SYSTEM: ICD-10-CM

## 2021-09-22 DIAGNOSIS — Z86.39 PERSONAL HISTORY OF OTHER ENDOCRINE, NUTRITIONAL AND METABOLIC DISEASE: ICD-10-CM

## 2021-09-22 DIAGNOSIS — Z86.69 PERSONAL HISTORY OF OTHER DISEASES OF THE NERVOUS SYSTEM AND SENSE ORGANS: ICD-10-CM

## 2021-09-22 PROCEDURE — 99204 OFFICE O/P NEW MOD 45 MIN: CPT

## 2021-09-22 NOTE — HISTORY OF PRESENT ILLNESS
[> 3 months] : more  than 3 months [FreeTextEntry1] : NPH  [de-identified] : \par Mr Shree hernandez is here for a consult for NPH.  He was sent here by Dr SON Guevara after he had a LP in July and had improvement of his gait and memory.  For two years his spouse noticed memory changes and shuffling gait.  She had a neurology examination and NPH was considered.  LP was done  July 9, 2021 at [facility] and withdrawal of CSF with a few days of improvement of his cognition.  He has no progression of symptoms but the symptoms are not improving.  He has rare urine incontinence and suffers from urgency.    A PET Scan was done at [facility], [place] not suggestive of NPH.

## 2021-09-22 NOTE — ASSESSMENT
[FreeTextEntry1] : \par Possible NPH.  CT scan is not suggestive of NPH . No shuffling gait.  MMS examination was a 25.  The patient was not in favor of undergoing a permanent VPS placement.   A discussion about NPH took place and  it is not convincing that he has NPH.  He will follow up with Neurology.  A three day lumbar trial was not scheduled and if his symptoms are worsening he will contact the office.

## 2021-09-22 NOTE — DATA REVIEWED
[de-identified] : PET Scan of brain 6/9/2021  MRI one at Dr Guevara office 4/8/2021 (images not available)

## 2021-09-22 NOTE — PHYSICAL EXAM
[General Appearance - Alert] : alert [General Appearance - In No Acute Distress] : in no acute distress [Oriented To Time, Place, And Person] : oriented to person, place, and time [Impaired Insight] : insight and judgment were intact [Affect] : the affect was normal [Person] : oriented to person [Place] : oriented to place [Time] : oriented to time [Short Term Intact] : short term memory intact [Remote Intact] : remote memory intact [Span Intact] : the attention span was normal [Concentration Intact] : normal concentrating ability [Fluency] : fluency intact [Comprehension] : comprehension intact [Current Events] : adequate knowledge of current events [Past History] : adequate knowledge of personal past history [Vocabulary] : adequate range of vocabulary [Cranial Nerves Optic (II)] : visual acuity intact bilaterally,  pupils equal round and reactive to light [Cranial Nerves Oculomotor (III)] : extraocular motion intact [Cranial Nerves Vestibulocochlear (VIII)] : hearing was intact bilaterally [Cranial Nerves Accessory (XI - Cranial And Spinal)] : head turning and shoulder shrug symmetric [Cranial Nerves Hypoglossal (XII)] : there was no tongue deviation with protrusion [Motor Tone] : muscle tone was normal in all four extremities [Motor Strength] : muscle strength was normal in all four extremities [Involuntary Movements] : no involuntary movements were seen [No Muscle Atrophy] : normal bulk in all four extremities [Sensation Tactile Decrease] : light touch was intact [Balance] : balance was intact [2+] : Patella left 2+ [No Visual Abnormalities] : no visible abnormailities [Full ROM] : full ROM [Able to toe walk] : the patient was able to toe walk [Able to heel walk] : the patient was able to heel walk [Intact] : all motor groups within normal limits of strength and tone bilaterally [Sclera] : the sclera and conjunctiva were normal [PERRL With Normal Accommodation] : pupils were equal in size, round, reactive to light, with normal accommodation [Outer Ear] : the ears and nose were normal in appearance [Neck Appearance] : the appearance of the neck was normal [] : no respiratory distress [Abnormal Walk] : normal gait [Skin Color & Pigmentation] : normal skin color and pigmentation [Past-pointing] : there was no past-pointing [Tremor] : no tremor present [___] : absent on the right [___] : absent on the left

## 2021-09-22 NOTE — REASON FOR VISIT
[New Patient Visit] : a new patient visit [Referred By: _________] : Patient was referred by DASH [Family Member] : family member [FreeTextEntry1] : NPH

## 2022-09-09 ENCOUNTER — APPOINTMENT (OUTPATIENT)
Dept: ULTRASOUND IMAGING | Facility: CLINIC | Age: 72
End: 2022-09-09

## 2022-09-09 ENCOUNTER — OUTPATIENT (OUTPATIENT)
Dept: OUTPATIENT SERVICES | Facility: HOSPITAL | Age: 72
LOS: 1 days | End: 2022-09-09
Payer: MEDICARE

## 2022-09-09 DIAGNOSIS — B18.2 CHRONIC VIRAL HEPATITIS C: ICD-10-CM

## 2022-09-09 DIAGNOSIS — Z00.8 ENCOUNTER FOR OTHER GENERAL EXAMINATION: ICD-10-CM

## 2022-09-09 PROCEDURE — 76700 US EXAM ABDOM COMPLETE: CPT | Mod: 26

## 2022-09-09 PROCEDURE — 76700 US EXAM ABDOM COMPLETE: CPT

## 2022-09-12 LAB
ALBUMIN SERPL ELPH-MCNC: 4.4 G/DL
ALP BLD-CCNC: 94 U/L
ALT SERPL-CCNC: 18 U/L
ANION GAP SERPL CALC-SCNC: 19 MMOL/L
AST SERPL-CCNC: 19 U/L
BASOPHILS # BLD AUTO: 0.04 K/UL
BASOPHILS NFR BLD AUTO: 0.5 %
BILIRUB SERPL-MCNC: 0.2 MG/DL
BUN SERPL-MCNC: 19 MG/DL
CALCIUM SERPL-MCNC: 9.7 MG/DL
CHLORIDE SERPL-SCNC: 104 MMOL/L
CO2 SERPL-SCNC: 18 MMOL/L
CREAT SERPL-MCNC: 0.66 MG/DL
EGFR: 99 ML/MIN/1.73M2
EOSINOPHIL # BLD AUTO: 0.24 K/UL
EOSINOPHIL NFR BLD AUTO: 2.9 %
HCT VFR BLD CALC: 41.3 %
HGB BLD-MCNC: 13.2 G/DL
IMM GRANULOCYTES NFR BLD AUTO: 0.6 %
LYMPHOCYTES # BLD AUTO: 1.36 K/UL
LYMPHOCYTES NFR BLD AUTO: 16.7 %
MAN DIFF?: NORMAL
MCHC RBC-ENTMCNC: 31.4 PG
MCHC RBC-ENTMCNC: 32 GM/DL
MCV RBC AUTO: 98.3 FL
MONOCYTES # BLD AUTO: 0.73 K/UL
MONOCYTES NFR BLD AUTO: 9 %
NEUTROPHILS # BLD AUTO: 5.72 K/UL
NEUTROPHILS NFR BLD AUTO: 70.3 %
PLATELET # BLD AUTO: 242 K/UL
POTASSIUM SERPL-SCNC: 4.3 MMOL/L
PROT SERPL-MCNC: 7 G/DL
RBC # BLD: 4.2 M/UL
RBC # FLD: 12.5 %
SODIUM SERPL-SCNC: 141 MMOL/L
WBC # FLD AUTO: 8.14 K/UL

## 2022-09-14 ENCOUNTER — APPOINTMENT (OUTPATIENT)
Dept: HEPATOLOGY | Facility: CLINIC | Age: 72
End: 2022-09-14

## 2022-09-14 VITALS
OXYGEN SATURATION: 97 % | WEIGHT: 153 LBS | HEIGHT: 66 IN | TEMPERATURE: 97.8 F | BODY MASS INDEX: 24.59 KG/M2 | RESPIRATION RATE: 14 BRPM | HEART RATE: 86 BPM | SYSTOLIC BLOOD PRESSURE: 132 MMHG | DIASTOLIC BLOOD PRESSURE: 79 MMHG

## 2022-09-14 PROCEDURE — 99215 OFFICE O/P EST HI 40 MIN: CPT

## 2022-09-14 PROCEDURE — 91200 LIVER ELASTOGRAPHY: CPT

## 2022-09-15 NOTE — HISTORY OF PRESENT ILLNESS
[___] : Performed [unfilled] [FreeTextEntry1] : Mr. AMRIK INMAN is 72 year old male who presents for the follow up appointment with Fatty liver and H/O HCV treatment now SVR. He is feeling well without any other complaints. He denies abdominal pain, nausea, vomiting, jaundice or pruritus. He has no changes in bowel habits. He does not do routine exercise. \par \par Recently was teated for bladder cancer s/p 6 months of hormonal therapy and radiation in May - July 2022. \par Pertinent H/O hepatitis C who completed 28 weeks of therapy for hepatitis C with Pegasys, ribavirin and Victrelis on March 5, 2012 and is a sustained virologic responder.  \par \par Fibroscan on 09/14/2022 shows F0-1 (E 5.5 kPa) and S0 ( dB/m)\par Fibroscan on 09/01/2021 shows F0-1 (E 4.0 kPa) and S2 ( dB/m). \par Fibroscan test from 7/19/19 showed F0-F1, S1\par Fibroscan test performed 7/20/17 showed F0-F1, steatosis S1. \par Fibroscan test from 5/8/15 showed F0-F1. \par Colonoscopy from 8/29/17 was normal.\par Colonoscopy in May 2010 revealing normal colon. FH/o mother had colon cancer in Dec 2016. \par \par 09/09/22 Labs: WDL-CBC, BMP with liver enzymes. Noted chronic high anion gap with low Co2. WNL US ab.\par \par 08/25/2021 Labs: WDL- CMP, CBC except elevated WBC 10.89 with and HCV- RNA not detected. US abs shows No mass or morphologic changes to suggest cirrhosis. \par \par Ultrasound Abdomen 07/25/20 Normal Abdominal Ultrasound results with No focal lesions. Blood tests from 07/23/20 HCV RNA not detected. TPro 7.6, Alb 4.9 g/dL, Tbil 0.3 mg/dL, AST 16, ALT 13, ALP 82 U/L \par June 1, 2017 ALT 23, AST 22, total bilirubin 0.3, HCV RNA not detected.\par Blood test from March 12, 2015 ALT 14, AST 15, total bilirubin 0.3, HCV RNA not detected.\par 2/20/14 ALT 19, AST 20, total bilirubin 0.2, albumin 4.1, normal CBC with platelets, HCV RNA not detected.\par

## 2022-09-15 NOTE — PHYSICAL EXAM
[General Appearance - Alert] : alert [General Appearance - In No Acute Distress] : in no acute distress [General Appearance - Well Nourished] : well nourished [Sclera] : the sclera and conjunctiva were normal [Neck Appearance] : the appearance of the neck was normal [Neck Cervical Mass (___cm)] : no neck mass was observed [Jugular Venous Distention Increased] : there was no jugular-venous distention [Thyroid Diffuse Enlargement] : the thyroid was not enlarged [Thyroid Nodule] : there were no palpable thyroid nodules [Auscultation Breath Sounds / Voice Sounds] : lungs were clear to auscultation bilaterally [Heart Rate And Rhythm] : heart rate was normal and rhythm regular [Heart Sounds] : normal S1 and S2 [Full Pulse] : the pedal pulses are present [Edema] : there was no peripheral edema [Bowel Sounds] : normal bowel sounds [Abdomen Soft] : soft [Abdomen Tenderness] : non-tender [Abdomen Mass (___ Cm)] : no abdominal mass palpated [Cervical Lymph Nodes Enlarged Posterior Bilaterally] : posterior cervical [Supraclavicular Lymph Nodes Enlarged Bilaterally] : supraclavicular [Abnormal Walk] : normal gait [Skin Color & Pigmentation] : normal skin color and pigmentation [] : no rash [No Focal Deficits] : no focal deficits [Oriented To Time, Place, And Person] : oriented to person, place, and time [Scleral Icterus] : No Scleral Icterus [Hepatojugular Reflux] : patient did not have a sustained hepatojugular reflux [Spider Angioma] : No spider angioma(s) were observed [Abdominal Bruit] : no abdominal bruit [Abdominal  Ascites] : no ascites [Splenomegaly] : no splenomegaly [Asterixis] : no asterixis observed [Jaundice] : No jaundice [Palmar Erythema] : no Palmar Erythema

## 2022-09-15 NOTE — ASSESSMENT
[FreeTextEntry1] : Mr. AMRIK INMAN is 72 year old male with Fatty liver and H/O HCV treatment now SVR. \par \par Recently was teated for bladder cancer s/p 6 months of hormonal therapy and radiation in May - July 2022. Reviewed the 09/09/22 Labs: WDL- CBC, BMP with liver enzymes. WNL US ab.\par ++ Noted chronic high anion gap with low Co2. Advised to F/u with PCP for better DM management.\par \par # Hepatic fibrosis - Fibroscan on 09/14/2022 shows F0-1 (E 5.5 kPa) US abs on 09/09/22 shows No mass or morphologic changes to suggest fibrosis. \par \par # Hepatic steatosis - Fibroscan on 09/14/2022 shows S0 ( dB/m) c/t 09/01/2021 shows S2 ( dB/m). \par AMRIK was educated about the fatty liver disease. Reviewed the spectrum of disease, the risk of disease progression to developing cirrhosis and the associated complications. Explained the patient may develop liver cancer without cirrhosis and therefore should be under the yearly surveillance with an abdominal ultrasound. Taught back that the best treatment of fatty liver disease is diet and exercise. Discussed the present diet with the patient and recommended the avoidance of fatty foods and to follow a heart healthy diet. Also explained that weight loss may lead to an improvement in the overall underlying liver disease. Taught back the physiology of alcohol abstinence has a important contribution to liver health. 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  \par # Immunity – Will check for Immunity to HAV, HBV with next labs. Could get vaccinated with the PCP if non-reactive.\par \par # Colonoscopy- Colonoscopy from 8/29/17 was normal. His mother has colon cancer in Dec 2016. Due for repeat colonoscopy in 2022. Given GI referral.\par \par PLAN To F/U in one year with repeat labs, US imaging and FS with PCP.\par Encouraged to call back in the interim with any issues or concerns so that we can address and assist as required.\par \par

## 2023-10-13 ENCOUNTER — OUTPATIENT (OUTPATIENT)
Dept: OUTPATIENT SERVICES | Facility: HOSPITAL | Age: 73
LOS: 1 days | End: 2023-10-13
Payer: MEDICARE

## 2023-10-13 ENCOUNTER — APPOINTMENT (OUTPATIENT)
Dept: ULTRASOUND IMAGING | Facility: CLINIC | Age: 73
End: 2023-10-13
Payer: MEDICARE

## 2023-10-13 DIAGNOSIS — K76.0 FATTY (CHANGE OF) LIVER, NOT ELSEWHERE CLASSIFIED: ICD-10-CM

## 2023-10-13 PROCEDURE — 76700 US EXAM ABDOM COMPLETE: CPT | Mod: 26

## 2023-10-13 PROCEDURE — 76700 US EXAM ABDOM COMPLETE: CPT

## 2023-10-16 ENCOUNTER — APPOINTMENT (OUTPATIENT)
Dept: HEPATOLOGY | Facility: CLINIC | Age: 73
End: 2023-10-16
Payer: MEDICARE

## 2023-10-16 VITALS
OXYGEN SATURATION: 96 % | BODY MASS INDEX: 25.88 KG/M2 | DIASTOLIC BLOOD PRESSURE: 78 MMHG | HEIGHT: 66 IN | TEMPERATURE: 98.2 F | SYSTOLIC BLOOD PRESSURE: 124 MMHG | WEIGHT: 161 LBS | HEART RATE: 80 BPM

## 2023-10-16 DIAGNOSIS — E11.9 TYPE 2 DIABETES MELLITUS W/OUT COMPLICATIONS: ICD-10-CM

## 2023-10-16 DIAGNOSIS — K76.0 FATTY (CHANGE OF) LIVER, NOT ELSEWHERE CLASSIFIED: ICD-10-CM

## 2023-10-16 DIAGNOSIS — B18.2 CHRONIC VIRAL HEPATITIS C: ICD-10-CM

## 2023-10-16 DIAGNOSIS — R93.2 ABNORMAL FINDINGS ON DIAGNOSTIC IMAGING OF LIVER AND BILIARY TRACT: ICD-10-CM

## 2023-10-16 LAB
ALBUMIN SERPL ELPH-MCNC: 4.6 G/DL
ALP BLD-CCNC: 91 U/L
ALT SERPL-CCNC: 21 U/L
ANION GAP SERPL CALC-SCNC: 15 MMOL/L
AST SERPL-CCNC: 20 U/L
BILIRUB SERPL-MCNC: 0.3 MG/DL
BUN SERPL-MCNC: 16 MG/DL
CALCIUM SERPL-MCNC: 10.2 MG/DL
CHLORIDE SERPL-SCNC: 101 MMOL/L
CO2 SERPL-SCNC: 25 MMOL/L
CREAT SERPL-MCNC: 0.7 MG/DL
EGFR: 97 ML/MIN/1.73M2
HCV RNA SERPL NAA+PROBE-LOG IU: NOT DETECTED LOGIU/ML
HEPC RNA INTERP: NOT DETECTED
INR PPP: 0.9 RATIO
POTASSIUM SERPL-SCNC: 4.7 MMOL/L
PROT SERPL-MCNC: 7.2 G/DL
PT BLD: 10.3 SEC
SODIUM SERPL-SCNC: 140 MMOL/L

## 2023-10-16 PROCEDURE — 99215 OFFICE O/P EST HI 40 MIN: CPT

## 2023-10-16 PROCEDURE — 76981 USE PARENCHYMA: CPT

## 2023-10-17 PROBLEM — E11.9 DIABETES: Status: ACTIVE | Noted: 2018-09-24

## 2023-10-17 PROBLEM — R93.2 ABNORMAL GALL BLADDER DIAGNOSTIC IMAGING: Status: ACTIVE | Noted: 2023-10-17

## 2023-10-23 PROBLEM — K76.0 HEPATIC STEATOSIS: Status: ACTIVE | Noted: 2021-09-02

## 2024-05-01 ENCOUNTER — NON-APPOINTMENT (OUTPATIENT)
Age: 74
End: 2024-05-01

## 2024-05-01 ENCOUNTER — APPOINTMENT (OUTPATIENT)
Age: 74
End: 2024-05-01
Payer: MEDICARE

## 2024-05-01 VITALS
WEIGHT: 155 LBS | BODY MASS INDEX: 24.91 KG/M2 | HEART RATE: 85 BPM | SYSTOLIC BLOOD PRESSURE: 130 MMHG | DIASTOLIC BLOOD PRESSURE: 75 MMHG | HEIGHT: 66 IN

## 2024-05-01 DIAGNOSIS — H93.13 TINNITUS, BILATERAL: ICD-10-CM

## 2024-05-01 DIAGNOSIS — H61.893 OTHER SPECIFIED DISORDERS OF EXTERNAL EAR, BILATERAL: ICD-10-CM

## 2024-05-01 DIAGNOSIS — H93.8X1 OTHER SPECIFIED DISORDERS OF RIGHT EAR: ICD-10-CM

## 2024-05-01 PROCEDURE — 99203 OFFICE O/P NEW LOW 30 MIN: CPT

## 2024-05-01 RX ORDER — MOMETASONE FUROATE 1 MG/ML
0.1 SOLUTION TOPICAL
Qty: 30 | Refills: 1 | Status: ACTIVE | COMMUNITY
Start: 2024-05-01 | End: 1900-01-01

## 2024-05-01 NOTE — CONSULT LETTER
[Dear  ___] : Dear  [unfilled], [Consult Letter:] : I had the pleasure of evaluating your patient, [unfilled]. [Please see my note below.] : Please see my note below. [Consult Closing:] : Thank you very much for allowing me to participate in the care of this patient.  If you have any questions, please do not hesitate to contact me. [Sincerely,] : Sincerely, [FreeTextEntry3] : Diya Iqbal MD Otolaryngology and Skull base Surgery [facility]

## 2024-05-01 NOTE — ASSESSMENT
[FreeTextEntry1] : 72 yo male with crackling in the right ear after beach vacation, reports a lot of sand clogging his ear at the time. Went to urgent care on return from vacation and given ear drops and oral antibiotics for a supposed throat infection.  -no sand on examination today; residual residue from the ear drops -history of SNHL, wears hearing aids on occasion; declined hearing eval today  -Baseline tinnitus bilaterally, unchanged for many years  Ear itching and flaking - Rx mometasone drops to use as needed for itching  follow up as needed

## 2024-05-01 NOTE — END OF VISIT
[FreeTextEntry3] : I personally saw and examined Mr. AMRIK INMAN in detail this visit today. I personally reviewed the HPI, PMH, FH, SH, ROS and medications/allergies. I have spoken to SHAYNA Borges ([place]) regarding the history and have personally determined the assessment and plan of care, and documented this myself. I was present and participated in all key portions of the encounter and all procedures noted above. I have made changes in the body of the note where appropriate.  Attesting Faculty: See Attending Signature Below

## 2024-05-01 NOTE — PHYSICAL EXAM
[Normal] : normal appearance, well groomed, well nourished, and in no acute distress [de-identified] : mild residue from ear drops in the right EAC, no sand noted

## 2024-05-01 NOTE — HISTORY OF PRESENT ILLNESS
[de-identified] : Patient is a 74 yo male with crackling in the right ear since he got sand in the ears a few weeks ago on vacation. Crackling has subsided, happens when he yawns  No pain in the ears, no changes in hearing recently but he has known hearing loss and uses hearing aids  Baseline tinnitus for 60 years in the right ear, unchanged.. Once in awhile gets itching and flakes in the ears bilaterally  Went to urgent care for ear crackling and mild throat pain, told he had swollen glands and given ciprodex for the ear and antibiotics for throat. no current throat pain